# Patient Record
Sex: FEMALE | Race: WHITE | NOT HISPANIC OR LATINO | URBAN - METROPOLITAN AREA
[De-identification: names, ages, dates, MRNs, and addresses within clinical notes are randomized per-mention and may not be internally consistent; named-entity substitution may affect disease eponyms.]

---

## 2023-05-22 ENCOUNTER — OFFICE VISIT (OUTPATIENT)
Dept: URBAN - METROPOLITAN AREA CLINIC 68 | Facility: CLINIC | Age: 58
End: 2023-05-22

## 2023-05-22 PROBLEM — 305058001: Status: ACTIVE | Noted: 2023-05-22

## 2023-05-22 RX ORDER — MULTIVIT-MIN/IRON/FOLIC ACID/K 18-600-40
1 CAPSULE CAPSULE ORAL ONCE A DAY
Status: ACTIVE | COMMUNITY

## 2023-05-22 RX ORDER — PRAVASTATIN SODIUM 40 MG/1
1 TABLET TABLET ORAL ONCE A DAY
Status: ACTIVE | COMMUNITY

## 2023-05-22 RX ORDER — MULTIVITAMIN WITH IRON
AS DIRECTED TABLET ORAL
Status: ACTIVE | COMMUNITY

## 2023-05-22 RX ORDER — SODIUM PICOSULFATE, MAGNESIUM OXIDE, AND ANHYDROUS CITRIC ACID 12; 3.5; 1 G/175ML; G/175ML; MG/175ML
175 ML THE FIRST DOSE THE EVENING BEFORE AND SECOND DOSE THE MORNING OF COLONOSCOPY LIQUID ORAL ONCE A DAY
Qty: 350 | OUTPATIENT
Start: 2023-05-22 | End: 2023-05-24

## 2023-05-22 NOTE — EXAM-MIGRATED EXAMINATIONS
General Examination: General appearance: -> alert, pleasant, well-nourished and in no acute distress   Head: -> normocephalic, atraumatic   Throat: -> clear, no erythema   Neck / thyroid: -> neck is supple with full ROM and no cervical lymphadenopathy, no masses or tenderness   Skin: -> skin is warm and dry, with no rashes, good skin turgor and normal hair distribution   Heart: -> regular rate and rhythm without murmurs, gallops, clicks or rubs   Lungs: -> clear to auscultation bilaterally, with good air movement and no rales, rhonchi or wheezes   Chest: -> chest wall with no costochondral junction tenderness, no rib deformity and normal shape and expansion   Abdomen: -> soft with good bowel sounds, nontender, and no masses or hepatosplenomegaly, no guarding or rigidity   Rectal: -> not examined   Extremities: -> normal extremities with no clubbing, cyanosis or edema   Neurologic: -> alert and oriented

## 2023-05-22 NOTE — HPI-MIGRATED HPI
General : Patient is a 56 y/o female here for colonoscopy screening.  No previous colonoscopy.  She does report some excessive gas for the past year.  Per patient, she has had imaging done for renal stones several times which have shown excess gas in the abdomen.  Maternal grandmother diagnosed with colon cancer, unsure at what age.  No anticoagulant therapy.  She denies any nausea/vomiting, abdominal pain, rectal bleeding, melena, weight loss.

## 2023-06-28 ENCOUNTER — WEB ENCOUNTER (OUTPATIENT)
Dept: URBAN - METROPOLITAN AREA SURGERY CENTER 12 | Facility: SURGERY CENTER | Age: 58
End: 2023-06-28

## 2023-06-28 ENCOUNTER — CLAIMS CREATED FROM THE CLAIM WINDOW (OUTPATIENT)
Dept: URBAN - METROPOLITAN AREA SURGERY CENTER 12 | Facility: SURGERY CENTER | Age: 58
End: 2023-06-28
Payer: COMMERCIAL

## 2023-06-28 ENCOUNTER — CLAIMS CREATED FROM THE CLAIM WINDOW (OUTPATIENT)
Dept: URBAN - METROPOLITAN AREA CLINIC 4 | Facility: CLINIC | Age: 58
End: 2023-06-28
Payer: COMMERCIAL

## 2023-06-28 DIAGNOSIS — K52.9 NONINFECTIOUS GASTROENTERITIS, UNSPECIFIED TYPE: ICD-10-CM

## 2023-06-28 DIAGNOSIS — K63.5 POLYP OF DESCENDING COLON, UNSPECIFIED TYPE: ICD-10-CM

## 2023-06-28 DIAGNOSIS — Z12.11 ENCOUNTER FOR SCREENING FOR MALIGNANT NEOPLASM OF COLON: ICD-10-CM

## 2023-06-28 DIAGNOSIS — K63.89 OTHER SPECIFIED DISEASES OF INTESTINE: ICD-10-CM

## 2023-06-28 DIAGNOSIS — K52.89 OTHER AND UNSPECIFIED NONINFECTIOUS GASTROENTERITIS AND COLITIS: ICD-10-CM

## 2023-06-28 DIAGNOSIS — Z86.010 PERSONAL HISTORY OF COLONIC POLYPS: ICD-10-CM

## 2023-06-28 DIAGNOSIS — K64.0 FIRST DEGREE HEMORRHOIDS: ICD-10-CM

## 2023-06-28 PROCEDURE — 88300 SURGICAL PATH GROSS: CPT | Performed by: PATHOLOGY

## 2023-06-28 PROCEDURE — 45385 COLONOSCOPY W/LESION REMOVAL: CPT | Performed by: INTERNAL MEDICINE

## 2023-06-28 PROCEDURE — 45380 COLONOSCOPY AND BIOPSY: CPT | Performed by: INTERNAL MEDICINE

## 2023-06-28 PROCEDURE — 88305 TISSUE EXAM BY PATHOLOGIST: CPT | Performed by: PATHOLOGY

## 2023-06-28 PROCEDURE — 00811 ANES LWR INTST NDSC NOS: CPT | Performed by: NURSE ANESTHETIST, CERTIFIED REGISTERED

## 2023-06-28 RX ORDER — PRAVASTATIN SODIUM 40 MG/1
1 TABLET TABLET ORAL ONCE A DAY
Status: ACTIVE | COMMUNITY

## 2023-06-28 RX ORDER — MULTIVITAMIN WITH IRON
AS DIRECTED TABLET ORAL
Status: ACTIVE | COMMUNITY

## 2023-06-28 RX ORDER — MULTIVIT-MIN/IRON/FOLIC ACID/K 18-600-40
1 CAPSULE CAPSULE ORAL ONCE A DAY
Status: ACTIVE | COMMUNITY

## 2023-07-07 ENCOUNTER — WEB ENCOUNTER (OUTPATIENT)
Dept: URBAN - METROPOLITAN AREA CLINIC 68 | Facility: CLINIC | Age: 58
End: 2023-07-07

## 2023-07-11 PROBLEM — 428283002: Status: ACTIVE | Noted: 2023-07-11

## 2023-07-11 PROBLEM — 90458007: Status: ACTIVE | Noted: 2023-07-11

## 2023-07-12 ENCOUNTER — OFFICE VISIT (OUTPATIENT)
Dept: URBAN - METROPOLITAN AREA CLINIC 68 | Facility: CLINIC | Age: 58
End: 2023-07-12
Payer: COMMERCIAL

## 2023-07-12 ENCOUNTER — LAB OUTSIDE AN ENCOUNTER (OUTPATIENT)
Dept: URBAN - METROPOLITAN AREA CLINIC 68 | Facility: CLINIC | Age: 58
End: 2023-07-12

## 2023-07-12 VITALS
BODY MASS INDEX: 20.61 KG/M2 | WEIGHT: 136 LBS | HEIGHT: 68 IN | SYSTOLIC BLOOD PRESSURE: 122 MMHG | DIASTOLIC BLOOD PRESSURE: 80 MMHG

## 2023-07-12 DIAGNOSIS — K64.8 INTERNAL HEMORRHOIDS: ICD-10-CM

## 2023-07-12 DIAGNOSIS — R14.3 FLATULENCE: ICD-10-CM

## 2023-07-12 DIAGNOSIS — R14.0 BLOATING: ICD-10-CM

## 2023-07-12 DIAGNOSIS — Z86.010 PERSONAL HISTORY OF COLONIC POLYPS: ICD-10-CM

## 2023-07-12 PROCEDURE — 99214 OFFICE O/P EST MOD 30 MIN: CPT

## 2023-07-12 RX ORDER — MULTIVIT-MIN/IRON/FOLIC ACID/K 18-600-40
1 CAPSULE CAPSULE ORAL ONCE A DAY
Status: ACTIVE | COMMUNITY

## 2023-07-12 RX ORDER — MULTIVITAMIN WITH IRON
AS DIRECTED TABLET ORAL
Status: ACTIVE | COMMUNITY

## 2023-07-12 RX ORDER — PRAVASTATIN SODIUM 40 MG/1
1 TABLET TABLET ORAL ONCE A DAY
Status: ACTIVE | COMMUNITY

## 2023-07-12 NOTE — HPI-TODAY'S VISIT:
Patient is a 58 y/o female here for follow-up of colonoscopy screening on 6/28/23 with 1 polyp, mild inflammation of the rectum and recto-sigmoid colon secondary to colitis/proctitis, IH, biopsies benign.  She is asymptomatic other than complaints of excess gas which has been an ongoing issue for the past year.  She did try a low FODMAP diet and was able to identify a few food intolerances including beans and onions, but is still experiencing bloating and flatulence.  She is going to trial IBguard and make sure to stay adequately hydrated.   She reports several courses of antibiotic therapy this past year due to UTI's and is concerned about gut osmel imbalance. She denies any nausea/vomiting, abdominal pain, rectal bleeding, melena, weight loss.

## 2023-07-20 ENCOUNTER — OFFICE VISIT (OUTPATIENT)
Dept: URBAN - METROPOLITAN AREA CLINIC 67 | Facility: CLINIC | Age: 58
End: 2023-07-20

## 2023-08-10 ENCOUNTER — OFFICE VISIT (OUTPATIENT)
Dept: URBAN - METROPOLITAN AREA CLINIC 67 | Facility: CLINIC | Age: 58
End: 2023-08-10
Payer: COMMERCIAL

## 2023-08-10 DIAGNOSIS — R14.0 BLOATING: ICD-10-CM

## 2023-08-10 PROCEDURE — 91065 BREATH HYDROGEN/METHANE TEST: CPT | Performed by: INTERNAL MEDICINE

## 2023-08-16 PROBLEM — 197125005: Status: ACTIVE | Noted: 2023-08-16

## 2023-08-17 ENCOUNTER — TELEPHONE ENCOUNTER (OUTPATIENT)
Dept: URBAN - METROPOLITAN AREA CLINIC 68 | Facility: CLINIC | Age: 58
End: 2023-08-17

## 2023-08-17 ENCOUNTER — OFFICE VISIT (OUTPATIENT)
Dept: URBAN - METROPOLITAN AREA TELEHEALTH 1 | Facility: TELEHEALTH | Age: 58
End: 2023-08-17
Payer: COMMERCIAL

## 2023-08-17 VITALS — BODY MASS INDEX: 20.61 KG/M2 | HEIGHT: 68 IN | WEIGHT: 136 LBS

## 2023-08-17 DIAGNOSIS — K58.0 IRRITABLE BOWEL SYNDROME WITH DIARRHEA: ICD-10-CM

## 2023-08-17 DIAGNOSIS — R14.3 FLATULENCE: ICD-10-CM

## 2023-08-17 DIAGNOSIS — K63.89 SMALL INTESTINAL BACTERIAL OVERGROWTH: ICD-10-CM

## 2023-08-17 DIAGNOSIS — R14.0 BLOATING: ICD-10-CM

## 2023-08-17 DIAGNOSIS — Z86.010 PERSONAL HISTORY OF COLONIC POLYPS: ICD-10-CM

## 2023-08-17 PROBLEM — 446081009: Status: ACTIVE | Noted: 2023-08-16

## 2023-08-17 PROCEDURE — 99214 OFFICE O/P EST MOD 30 MIN: CPT

## 2023-08-17 RX ORDER — PRAVASTATIN SODIUM 40 MG/1
1 TABLET TABLET ORAL ONCE A DAY
Status: ACTIVE | COMMUNITY

## 2023-08-17 RX ORDER — MULTIVITAMIN WITH IRON
AS DIRECTED TABLET ORAL
Status: ACTIVE | COMMUNITY

## 2023-08-17 RX ORDER — MULTIVIT-MIN/IRON/FOLIC ACID/K 18-600-40
1 CAPSULE CAPSULE ORAL ONCE A DAY
Status: ACTIVE | COMMUNITY

## 2023-08-17 RX ORDER — RIFAXIMIN 550 MG/1
1 TABLET TABLET ORAL THREE TIMES A DAY
Qty: 42 TABLET | Refills: 0 | OUTPATIENT
Start: 2023-08-17 | End: 2023-09-16

## 2023-08-17 NOTE — HPI-TODAY'S VISIT:
56 y/o female presents via telehealth visit for review of hydrogen breath test positive for SIBO on 8/10/23.   Today she describes a sore throat and nasal congestion and states she has tested positive for Covid-19 with her PCP.  She describes continued symptoms of IBS with excessive gas. She did try a low FODMAP diet and was able to identify a few food intolerances including beans and onions, but is still experiencing bloating and flatulence. Will proceed with Xifaxan treatment for IBS-D and follow up in four weeks.  She is s/p colonoscopy on 6/28/23 with 1 polyp, mild inflammation of the rectum and recto-sigmoid colon secondary to colitis/proctitis, IH, biopsies benign.  She denies nausea/vomiting, dysphagia, abdominal pain, melena, rectal bleeding, weight loss, fever.

## 2023-08-21 ENCOUNTER — OFFICE VISIT (OUTPATIENT)
Dept: URBAN - METROPOLITAN AREA TELEHEALTH 1 | Facility: TELEHEALTH | Age: 58
End: 2023-08-21

## 2023-08-21 RX ORDER — MULTIVITAMIN WITH IRON
AS DIRECTED TABLET ORAL
Status: ACTIVE | COMMUNITY

## 2023-08-21 RX ORDER — MULTIVIT-MIN/IRON/FOLIC ACID/K 18-600-40
1 CAPSULE CAPSULE ORAL ONCE A DAY
Status: ACTIVE | COMMUNITY

## 2023-08-21 RX ORDER — PRAVASTATIN SODIUM 40 MG/1
1 TABLET TABLET ORAL ONCE A DAY
Status: ACTIVE | COMMUNITY

## 2023-08-21 RX ORDER — RIFAXIMIN 550 MG/1
1 TABLET TABLET ORAL THREE TIMES A DAY
Qty: 42 TABLET | Refills: 0 | Status: ACTIVE | COMMUNITY
Start: 2023-08-17 | End: 2023-09-16

## 2023-08-24 ENCOUNTER — TELEPHONE ENCOUNTER (OUTPATIENT)
Dept: URBAN - METROPOLITAN AREA CLINIC 68 | Facility: CLINIC | Age: 58
End: 2023-08-24

## 2023-08-24 ENCOUNTER — OFFICE VISIT (OUTPATIENT)
Dept: URBAN - METROPOLITAN AREA TELEHEALTH 1 | Facility: TELEHEALTH | Age: 58
End: 2023-08-24
Payer: COMMERCIAL

## 2023-08-24 VITALS — BODY MASS INDEX: 20.61 KG/M2 | WEIGHT: 136 LBS | HEIGHT: 68 IN

## 2023-08-24 DIAGNOSIS — R14.0 BLOATING: ICD-10-CM

## 2023-08-24 DIAGNOSIS — R14.3 FLATULENCE: ICD-10-CM

## 2023-08-24 DIAGNOSIS — Z86.010 PERSONAL HISTORY OF COLONIC POLYPS: ICD-10-CM

## 2023-08-24 DIAGNOSIS — K63.89 SMALL INTESTINAL BACTERIAL OVERGROWTH: ICD-10-CM

## 2023-08-24 PROCEDURE — 99213 OFFICE O/P EST LOW 20 MIN: CPT

## 2023-08-24 RX ORDER — RIFAXIMIN 550 MG/1
1 TABLET TABLET ORAL THREE TIMES A DAY
Qty: 42 TABLET | Refills: 0 | Status: ACTIVE | COMMUNITY
Start: 2023-08-17 | End: 2023-09-16

## 2023-08-24 RX ORDER — MULTIVIT-MIN/IRON/FOLIC ACID/K 18-600-40
1 CAPSULE CAPSULE ORAL ONCE A DAY
Status: ACTIVE | COMMUNITY

## 2023-08-24 RX ORDER — PRAVASTATIN SODIUM 40 MG/1
1 TABLET TABLET ORAL ONCE A DAY
Status: ACTIVE | COMMUNITY

## 2023-08-24 RX ORDER — MULTIVITAMIN WITH IRON
AS DIRECTED TABLET ORAL
Status: ACTIVE | COMMUNITY

## 2023-09-14 ENCOUNTER — DASHBOARD ENCOUNTERS (OUTPATIENT)
Age: 58
End: 2023-09-14

## 2023-09-14 ENCOUNTER — TELEPHONE ENCOUNTER (OUTPATIENT)
Dept: URBAN - METROPOLITAN AREA CLINIC 68 | Facility: CLINIC | Age: 58
End: 2023-09-14

## 2023-09-14 ENCOUNTER — OFFICE VISIT (OUTPATIENT)
Dept: URBAN - METROPOLITAN AREA TELEHEALTH 1 | Facility: TELEHEALTH | Age: 58
End: 2023-09-14
Payer: COMMERCIAL

## 2023-09-14 VITALS
DIASTOLIC BLOOD PRESSURE: 80 MMHG | HEIGHT: 68 IN | WEIGHT: 136 LBS | SYSTOLIC BLOOD PRESSURE: 128 MMHG | BODY MASS INDEX: 20.61 KG/M2

## 2023-09-14 DIAGNOSIS — Z86.010 PERSONAL HISTORY OF COLONIC POLYPS: ICD-10-CM

## 2023-09-14 DIAGNOSIS — R14.3 FLATULENCE: ICD-10-CM

## 2023-09-14 DIAGNOSIS — R14.0 BLOATING: ICD-10-CM

## 2023-09-14 PROBLEM — 249504006: Status: ACTIVE | Noted: 2023-05-22

## 2023-09-14 PROBLEM — 116289008: Status: ACTIVE | Noted: 2023-07-12

## 2023-09-14 PROCEDURE — 99213 OFFICE O/P EST LOW 20 MIN: CPT

## 2023-09-14 RX ORDER — RIFAXIMIN 550 MG/1
1 TABLET TABLET ORAL THREE TIMES A DAY
Qty: 42 TABLET | Refills: 0 | COMMUNITY
Start: 2023-08-17 | End: 2023-09-16

## 2023-09-14 RX ORDER — PRAVASTATIN SODIUM 40 MG/1
1 TABLET TABLET ORAL ONCE A DAY
COMMUNITY

## 2023-09-14 RX ORDER — MULTIVITAMIN WITH IRON
AS DIRECTED TABLET ORAL
COMMUNITY

## 2023-09-14 RX ORDER — MULTIVIT-MIN/IRON/FOLIC ACID/K 18-600-40
1 CAPSULE CAPSULE ORAL ONCE A DAY
COMMUNITY

## 2023-09-14 NOTE — HPI-TODAY'S VISIT:
56 y/o female with history of bloating and gas for the past year.  She is s/p Xifaxan tx for SIBO positive hbt with minimal relief.  She continues with frequent bloating and excess gas, mostly at night.  She is recommended to try IBguard and follow up in three weeks. She is s/p colonoscopy on 6/28/23 with 1 polyp, mild inflammation of the rectum and recto-sigmoid colon secondary to colitis/proctitis, IH, biopsies benign (asymptomatic). She did try a low FODMAP diet and was able to identify a few food intolerances including beans and onions.  She also tried a gluten-free diet which did not improve her GI symptoms. Per patient, she has had imaging done for renal stones several times which have shown excess gas in the abdomen.  She has never had an upper endoscopy and defers at this time. She denies GERD, nausea/vomiting, dysphagia, abdominal pain, melena, rectal bleeding, weight loss, fever.

## 2024-10-24 NOTE — HPI-TODAY'S VISIT:
56 y/o female presents via telehealth visit for review of recent hbt results on 8/10/23.  During our last visit she was under the weather with Covid and feels she did not get a chance to fully understand her diagnosis.  Today she describes feeling much better and is able to ask questions regarding SIBO and Xifaxan.  She did speak with [facility] and Xifaxan is set to be delivered today.  She does continue with bloating and flatulence. Will follow up after Xifaxan treatment.        She is s/p colonoscopy on 6/28/23 with 1 polyp, mild inflammation of the rectum and recto-sigmoid colon secondary to colitis/proctitis, IH, biopsies benign.  She denies nausea/vomiting, dysphagia, abdominal pain, melena, rectal bleeding, weight loss, fever.
d/c plan tbd